# Patient Record
Sex: FEMALE | Race: WHITE | Employment: FULL TIME | URBAN - METROPOLITAN AREA
[De-identification: names, ages, dates, MRNs, and addresses within clinical notes are randomized per-mention and may not be internally consistent; named-entity substitution may affect disease eponyms.]

---

## 2017-03-03 ENCOUNTER — HOSPITAL ENCOUNTER (OUTPATIENT)
Dept: MAMMOGRAPHY | Age: 57
Discharge: HOME | End: 2017-03-03
Attending: GENERAL PRACTICE
Payer: COMMERCIAL

## 2017-03-03 DIAGNOSIS — Z12.31 VISIT FOR SCREENING MAMMOGRAM: ICD-10-CM

## 2017-03-03 PROCEDURE — 77067 SCR MAMMO BI INCL CAD: CPT

## 2018-03-28 ENCOUNTER — HOSPITAL ENCOUNTER (OUTPATIENT)
Dept: MAMMOGRAPHY | Age: 58
Discharge: HOME | End: 2018-03-28
Attending: GENERAL PRACTICE
Payer: COMMERCIAL

## 2018-03-28 DIAGNOSIS — Z12.31 SCREENING MAMMOGRAM, ENCOUNTER FOR: ICD-10-CM

## 2018-03-28 PROCEDURE — 77067 SCR MAMMO BI INCL CAD: CPT | Performed by: GENERAL PRACTICE

## 2019-06-19 ENCOUNTER — ORDER TRANSCRIPTION (OUTPATIENT)
Dept: ADMINISTRATIVE | Facility: HOSPITAL | Age: 59
End: 2019-06-19

## 2019-06-19 DIAGNOSIS — Z12.39 BREAST CANCER SCREENING: Primary | ICD-10-CM

## 2019-06-24 ENCOUNTER — HOSPITAL ENCOUNTER (OUTPATIENT)
Dept: MAMMOGRAPHY | Age: 59
Discharge: HOME | End: 2019-06-24
Attending: GENERAL PRACTICE
Payer: COMMERCIAL

## 2019-06-24 DIAGNOSIS — Z12.39 BREAST CANCER SCREENING: ICD-10-CM

## 2019-06-24 PROCEDURE — 77067 SCR MAMMO BI INCL CAD: CPT | Performed by: GENERAL PRACTICE

## 2019-07-08 PROBLEM — Z98.891 HISTORY OF CESAREAN SECTION: Status: ACTIVE | Noted: 2019-07-08

## 2019-07-08 PROCEDURE — 87624 HPV HI-RISK TYP POOLED RSLT: CPT | Performed by: NURSE PRACTITIONER

## 2019-07-08 PROCEDURE — 88175 CYTOPATH C/V AUTO FLUID REDO: CPT | Performed by: NURSE PRACTITIONER

## 2020-06-30 ENCOUNTER — ORDER TRANSCRIPTION (OUTPATIENT)
Dept: ADMINISTRATIVE | Facility: HOSPITAL | Age: 60
End: 2020-06-30

## 2020-06-30 DIAGNOSIS — Z12.31 ENCOUNTER FOR SCREENING MAMMOGRAM FOR MALIGNANT NEOPLASM OF BREAST: Primary | ICD-10-CM

## 2020-07-09 ENCOUNTER — HOSPITAL ENCOUNTER (OUTPATIENT)
Dept: MAMMOGRAPHY | Age: 60
Discharge: HOME | End: 2020-07-09
Attending: GENERAL PRACTICE
Payer: COMMERCIAL

## 2020-07-09 DIAGNOSIS — Z12.31 ENCOUNTER FOR SCREENING MAMMOGRAM FOR MALIGNANT NEOPLASM OF BREAST: ICD-10-CM

## 2020-07-09 PROCEDURE — 77067 SCR MAMMO BI INCL CAD: CPT | Performed by: GENERAL PRACTICE

## 2021-02-10 DIAGNOSIS — Z23 NEED FOR VACCINATION: ICD-10-CM

## 2021-02-15 ENCOUNTER — IMMUNIZATION (OUTPATIENT)
Dept: LAB | Age: 61
End: 2021-02-15
Attending: HOSPITALIST
Payer: COMMERCIAL

## 2021-02-15 DIAGNOSIS — Z23 NEED FOR VACCINATION: Primary | ICD-10-CM

## 2021-02-15 PROCEDURE — 0001A SARSCOV2 VAC 30MCG/0.3ML IM: CPT

## 2021-03-08 ENCOUNTER — IMMUNIZATION (OUTPATIENT)
Dept: LAB | Age: 61
End: 2021-03-08
Attending: HOSPITALIST
Payer: COMMERCIAL

## 2021-03-08 DIAGNOSIS — Z23 NEED FOR VACCINATION: Primary | ICD-10-CM

## 2021-03-08 PROCEDURE — 0002A SARSCOV2 VAC 30MCG/0.3ML IM: CPT

## 2021-07-22 ENCOUNTER — HOSPITAL ENCOUNTER (OUTPATIENT)
Dept: MAMMOGRAPHY | Age: 61
Discharge: HOME | End: 2021-07-22
Attending: OBSTETRICS & GYNECOLOGY
Payer: COMMERCIAL

## 2021-07-22 DIAGNOSIS — Z12.31 BREAST CANCER SCREENING BY MAMMOGRAM: ICD-10-CM

## 2021-07-22 PROCEDURE — 77063 BREAST TOMOSYNTHESIS BI: CPT | Performed by: GENERAL PRACTICE

## 2021-07-22 PROCEDURE — 77067 SCR MAMMO BI INCL CAD: CPT | Performed by: GENERAL PRACTICE

## 2022-07-11 ENCOUNTER — ORDER TRANSCRIPTION (OUTPATIENT)
Dept: ADMINISTRATIVE | Facility: HOSPITAL | Age: 62
End: 2022-07-11

## 2022-07-11 DIAGNOSIS — Z12.31 ENCOUNTER FOR SCREENING MAMMOGRAM FOR MALIGNANT NEOPLASM OF BREAST: Primary | ICD-10-CM

## 2022-07-25 ENCOUNTER — HOSPITAL ENCOUNTER (OUTPATIENT)
Dept: MAMMOGRAPHY | Age: 62
Discharge: HOME | End: 2022-07-25
Attending: GENERAL PRACTICE
Payer: COMMERCIAL

## 2022-07-25 DIAGNOSIS — Z12.31 ENCOUNTER FOR SCREENING MAMMOGRAM FOR MALIGNANT NEOPLASM OF BREAST: ICD-10-CM

## 2022-07-25 PROCEDURE — 77067 SCR MAMMO BI INCL CAD: CPT | Performed by: GENERAL PRACTICE

## 2022-07-25 PROCEDURE — 77063 BREAST TOMOSYNTHESIS BI: CPT | Performed by: GENERAL PRACTICE

## 2023-06-12 PROCEDURE — 87624 HPV HI-RISK TYP POOLED RSLT: CPT

## 2023-07-06 ENCOUNTER — HOSPITAL ENCOUNTER (OUTPATIENT)
Dept: BONE DENSITY | Age: 63
Discharge: HOME | End: 2023-07-06
Payer: COMMERCIAL

## 2023-07-06 DIAGNOSIS — Z78.0 MENOPAUSE: ICD-10-CM

## 2023-07-06 PROCEDURE — 77080 DXA BONE DENSITY AXIAL: CPT

## 2023-07-26 ENCOUNTER — HOSPITAL ENCOUNTER (OUTPATIENT)
Dept: MAMMOGRAPHY | Age: 63
Discharge: HOME | End: 2023-07-26
Payer: COMMERCIAL

## 2023-07-26 DIAGNOSIS — Z12.31 ENCOUNTER FOR SCREENING MAMMOGRAM FOR BREAST CANCER: ICD-10-CM

## 2023-07-26 PROCEDURE — 77063 BREAST TOMOSYNTHESIS BI: CPT

## 2023-07-26 PROCEDURE — 77067 SCR MAMMO BI INCL CAD: CPT

## 2024-07-18 ENCOUNTER — ORDER TRANSCRIPTION (OUTPATIENT)
Dept: ADMINISTRATIVE | Facility: HOSPITAL | Age: 64
End: 2024-07-18

## 2024-07-18 DIAGNOSIS — Z12.31 ENCOUNTER FOR SCREENING MAMMOGRAM FOR MALIGNANT NEOPLASM OF BREAST: Primary | ICD-10-CM

## 2024-07-29 ENCOUNTER — HOSPITAL ENCOUNTER (OUTPATIENT)
Dept: MAMMOGRAPHY | Age: 64
Discharge: HOME | End: 2024-07-29
Attending: GENERAL PRACTICE
Payer: COMMERCIAL

## 2024-07-29 DIAGNOSIS — Z12.31 ENCOUNTER FOR SCREENING MAMMOGRAM FOR MALIGNANT NEOPLASM OF BREAST: ICD-10-CM

## 2024-07-29 PROCEDURE — 77067 SCR MAMMO BI INCL CAD: CPT | Performed by: GENERAL PRACTICE

## 2024-07-29 PROCEDURE — 77063 BREAST TOMOSYNTHESIS BI: CPT | Performed by: GENERAL PRACTICE

## 2024-11-01 ENCOUNTER — APPOINTMENT (OUTPATIENT)
Dept: GENERAL RADIOLOGY | Age: 64
End: 2024-11-01
Attending: EMERGENCY MEDICINE
Payer: COMMERCIAL

## 2024-11-01 ENCOUNTER — HOSPITAL ENCOUNTER (OUTPATIENT)
Age: 64
Discharge: HOME | End: 2024-11-01
Attending: EMERGENCY MEDICINE
Payer: COMMERCIAL

## 2024-11-01 VITALS
OXYGEN SATURATION: 100 % | BODY MASS INDEX: 29.99 KG/M2 | HEART RATE: 70 BPM | HEIGHT: 65 IN | DIASTOLIC BLOOD PRESSURE: 85 MMHG | RESPIRATION RATE: 18 BRPM | TEMPERATURE: 98 F | WEIGHT: 180 LBS | SYSTOLIC BLOOD PRESSURE: 151 MMHG

## 2024-11-01 DIAGNOSIS — R00.2 PALPITATIONS: Primary | ICD-10-CM

## 2024-11-01 DIAGNOSIS — F43.0 STRESS REACTION: ICD-10-CM

## 2024-11-01 LAB
#MXD IC: 0.6 X10ˆ3/UL (ref 0.1–1)
BUN BLD-MCNC: 20 MG/DL (ref 7–18)
CHLORIDE BLD-SCNC: 102 MMOL/L (ref 98–112)
CO2 BLD-SCNC: 25 MMOL/L (ref 21–32)
CREAT BLD-MCNC: 1 MG/DL
DDIMER WHOLE BLOOD: <200 NG/ML DDU (ref ?–400)
EGFRCR SERPLBLD CKD-EPI 2021: 64 ML/MIN/1.73M2 (ref 60–?)
GLUCOSE BLD-MCNC: 96 MG/DL (ref 70–99)
HCT VFR BLD AUTO: 40.5 %
HCT VFR BLD CALC: 39 %
HGB BLD-MCNC: 13.5 G/DL
ISTAT IONIZED CALCIUM FOR CHEM 8: 1.21 MMOL/L (ref 1.12–1.32)
LYMPHOCYTES # BLD AUTO: 1.6 X10ˆ3/UL (ref 1–4)
LYMPHOCYTES NFR BLD AUTO: 27.9 %
MCH RBC QN AUTO: 29.9 PG (ref 26–34)
MCHC RBC AUTO-ENTMCNC: 33.3 G/DL (ref 31–37)
MCV RBC AUTO: 89.6 FL (ref 80–100)
MIXED CELL %: 11.4 %
NEUTROPHILS # BLD AUTO: 3.4 X10ˆ3/UL (ref 1.5–7.7)
NEUTROPHILS NFR BLD AUTO: 60.7 %
PLATELET # BLD AUTO: 254 X10ˆ3/UL (ref 150–450)
POTASSIUM BLD-SCNC: 4.1 MMOL/L (ref 3.6–5.1)
RBC # BLD AUTO: 4.52 X10ˆ6/UL
SODIUM BLD-SCNC: 139 MMOL/L (ref 136–145)
TROPONIN I BLD-MCNC: <0.02 NG/ML
WBC # BLD AUTO: 5.6 X10ˆ3/UL (ref 4–11)

## 2024-11-01 PROCEDURE — 85025 COMPLETE CBC W/AUTO DIFF WBC: CPT | Performed by: EMERGENCY MEDICINE

## 2024-11-01 PROCEDURE — 93005 ELECTROCARDIOGRAM TRACING: CPT

## 2024-11-01 PROCEDURE — 93010 ELECTROCARDIOGRAM REPORT: CPT

## 2024-11-01 PROCEDURE — 99215 OFFICE O/P EST HI 40 MIN: CPT

## 2024-11-01 PROCEDURE — 99205 OFFICE O/P NEW HI 60 MIN: CPT

## 2024-11-01 PROCEDURE — 80047 BASIC METABLC PNL IONIZED CA: CPT

## 2024-11-01 PROCEDURE — 84484 ASSAY OF TROPONIN QUANT: CPT

## 2024-11-01 PROCEDURE — 85378 FIBRIN DEGRADE SEMIQUANT: CPT | Performed by: EMERGENCY MEDICINE

## 2024-11-01 PROCEDURE — 36415 COLL VENOUS BLD VENIPUNCTURE: CPT

## 2024-11-01 PROCEDURE — 71046 X-RAY EXAM CHEST 2 VIEWS: CPT | Performed by: EMERGENCY MEDICINE

## 2024-11-01 NOTE — ED INITIAL ASSESSMENT (HPI)
Presents with chest palpitations and left arm pain that was appearing a few days ago. Numb sensation that was present in both arms and down middle back. No previous cardiac hx.   Not a current smoker.

## 2024-11-01 NOTE — ED PROVIDER NOTES
Patient Seen in: [facility]      History     Chief Complaint   Patient presents with    Palpitations     Stated Complaint: irregular heart beat/back pain    Subjective:   HPI  Feels a fluttering in her chest on and off for her \"whole life\". Never had it medically evaluated. Two days ago the fluttering worsened. Previously was intermittent and not every day. Two days ago had left sided chest pain all night long which has resolved. Since then the fluttering has been more peristtent. After the episode of  chest pain resolved she had a band of pain and  numbness across her back. The numbness radiated down both arms. The back pain and numbness are gone as well. Today the fluttering is more prominent after lunch. School nurse listened to her and told her she has irregular heart beat.    Stress level is high. Her son  in July. She is  and having difficulty in a relationship right now. She takes xanax to sleep at night. Says she is sleeping and eating well. Has a support system that is wonderful. Denies suicidal ideation.     Objective:     Past Medical History:    Bursitis    Herniated disc    Thyroid disease              Past Surgical History:   Procedure Laterality Date    Anesth, section                  Social History     Socioeconomic History    Marital status:    Tobacco Use    Smoking status: Former     Current packs/day: 0.00     Types: Cigarettes     Quit date: 2010     Years since quittin.8    Smokeless tobacco: Former   Vaping Use    Vaping status: Never Used   Substance and Sexual Activity    Alcohol use: Yes    Drug use: Never    Sexual activity: Yes     Partners: Male              Review of Systems    Positive for stated complaint: irregular heart beat/back pain  Other systems are as noted in HPI.  Constitutional and vital signs reviewed.      All other systems reviewed and negative except as noted above.    Physical Exam     ED Triage Vitals [24  1517]   /85   Pulse 70   Resp 18   Temp 97.9 °F (36.6 °C)   Temp src Temporal   SpO2 100 %   O2 Device None (Room air)       Current Vitals:   Vital Signs  BP: 151/85  Pulse: 70  Resp: 18  Temp: 97.9 °F (36.6 °C)  Temp src: Temporal    Oxygen Therapy  SpO2: 100 %  O2 Device: None (Room air)        Physical Exam  Vitals and nursing note reviewed.   Constitutional:       Appearance: Normal appearance. She is well-developed.   HENT:      Head: Normocephalic and atraumatic.   Cardiovascular:      Rate and Rhythm: Normal rate and regular rhythm.      Heart sounds: Normal heart sounds.   Pulmonary:      Effort: Pulmonary effort is normal. No respiratory distress.      Breath sounds: Normal breath sounds.   Musculoskeletal:      Comments: Left lower extremity swelling and mild tenderness around the ankle. Dilated superficial veins. Distal neurovascular intact.    Skin:     General: Skin is warm and dry.      Capillary Refill: Capillary refill takes less than 2 seconds.   Neurological:      General: No focal deficit present.      Mental Status: She is alert.      Sensory: No sensory deficit.   Psychiatric:         Mood and Affect: Mood normal.         Behavior: Behavior normal.            ED Course     Labs Reviewed   POCT ISTAT CHEM8 CARTRIDGE - Abnormal; Notable for the following components:       Result Value    ISTAT BUN 20 (*)     All other components within normal limits   D-DIMER (POC) - Normal   ISTAT TROPONIN - Normal   POCT CBC     EKG    Rate, intervals and axes as noted on EKG Report.  Rate: 70  Rhythm: Sinus Rhythm  Reading: PVC. No STEMI.                        MDM           Medical Decision Making  ACS, primary dysrhythmia, stress reaction all in dfferential. EKG reviewed by myself. Labs reviewed. CBC, CHEM, dimer and trop all normal. CXR reviewed by myself. No pneumonia. Likely atelectasis. No infectious symptoms.   Patient reassured. Advised to follow up with primary care for further eval. Also will  place behavioral health navigator order.     Disposition and Plan     Clinical Impression:  1. Palpitations    2. Stress reaction         Disposition:  Discharge  11/1/2024  5:15 pm    Follow-up:  Sonia Kinsey S [address] [id number] [phone]      As needed          Medications Prescribed:  Current Discharge Medication List              Supplementary Documentation:

## 2024-11-04 ENCOUNTER — TELEPHONE (OUTPATIENT)
Facility: CLINIC | Age: 64
End: 2024-11-04

## 2024-11-04 LAB
ATRIAL RATE: 70 BPM
P AXIS: 46 DEGREES
P-R INTERVAL: 172 MS
Q-T INTERVAL: 394 MS
QRS DURATION: 98 MS
QTC CALCULATION (BEZET): 425 MS
R AXIS: 15 DEGREES
T AXIS: 7 DEGREES
VENTRICULAR RATE: 70 BPM

## 2024-11-05 ENCOUNTER — TELEPHONE (OUTPATIENT)
Age: 64
End: 2024-11-05

## 2024-11-05 NOTE — TELEPHONE ENCOUNTER
Hello,     The [facility] Navigation team has attempted to reach you regarding an order from Dr. Turner's office. We are reaching out in order to assist you in coordinating care and resources that may meet your needs. Please give our office a call at [phone]. For more immediate assistance you can contact our 24-hour help line at [phone]. We look forward to hearing from you soon.

## 2024-11-13 ENCOUNTER — TELEPHONE (OUTPATIENT)
Age: 64
End: 2024-11-13

## 2024-11-13 NOTE — TELEPHONE ENCOUNTER
Hello,  Sorry I missed you - I am reaching out from the [facility] Navigation department, following up on an order from your provider's office to assist in connecting you with resources for care. If you would like to discuss this further, please give us a call back at [phone], or for more immediate assistance you can contact our 24-hour help line at [phone]. We look forward to hearing from you soon.

## 2024-12-03 ENCOUNTER — OFFICE VISIT (OUTPATIENT)
Facility: CLINIC | Age: 64
End: 2024-12-03
Payer: COMMERCIAL

## 2024-12-03 VITALS
WEIGHT: 177 LBS | HEIGHT: 65 IN | BODY MASS INDEX: 29.49 KG/M2 | DIASTOLIC BLOOD PRESSURE: 76 MMHG | SYSTOLIC BLOOD PRESSURE: 120 MMHG

## 2024-12-03 DIAGNOSIS — Z78.0 MENOPAUSE: ICD-10-CM

## 2024-12-03 DIAGNOSIS — Z12.11 SCREEN FOR COLON CANCER: ICD-10-CM

## 2024-12-03 DIAGNOSIS — Z01.419 ENCOUNTER FOR WELL WOMAN EXAM WITH ROUTINE GYNECOLOGICAL EXAM: Primary | ICD-10-CM

## 2024-12-03 DIAGNOSIS — Z12.6 SCREENING FOR BLADDER CANCER: ICD-10-CM

## 2024-12-03 LAB
APPEARANCE: CLEAR
BILIRUBIN: NEGATIVE
GLUCOSE (URINE DIPSTICK): NEGATIVE MG/DL
KETONES (URINE DIPSTICK): NEGATIVE MG/DL
LEUKOCYTES: NEGATIVE
NITRITE, URINE: NEGATIVE
OCCULT BLOOD: NEGATIVE
PH, URINE: 5 (ref 4.5–8)
PROTEIN (URINE DIPSTICK): NEGATIVE MG/DL
SPECIFIC GRAVITY: 1.02 (ref 1–1.03)
URINE-COLOR: YELLOW
UROBILINOGEN,SEMI-QN: 0.2 MG/DL (ref 0–1.9)

## 2024-12-03 PROCEDURE — 81003 URINALYSIS AUTO W/O SCOPE: CPT

## 2024-12-03 PROCEDURE — 99396 PREV VISIT EST AGE 40-64: CPT

## 2024-12-03 RX ORDER — ALPRAZOLAM 1 MG/1
1 TABLET ORAL NIGHTLY PRN
COMMUNITY

## 2024-12-03 RX ORDER — CALCIUM CARBONATE/VITAMIN D3 600 MG-10
250 TABLET ORAL DAILY
COMMUNITY

## 2024-12-03 NOTE — PROGRESS NOTES
GYN H&P     Genetic questionnaire reviewed with the patient and she will be referred for genetic counseling if the questionnaire had any positive results.    The [facility] intake form was also reviewed regarding contraception, menstrual periods, urinary health, and vaginal / sexual health    12/3/2024  4:58 PM    Chief Complaint   Patient presents with    Gynecologic Exam       HPI: Janet is a 64 year old  No LMP recorded. (Menstrual status: Menopause).  (contraception: postmenopausal) here for her annual gyn exam.     She has no complaints.  Menses absent with menopause, denies PMB or VMS. Denies any pelvic or breast complaints. Denies problems with vaginal dryness with intercourse. Her son recently passed away from MS-related complication in July. Coping well, denies need for additional resources.    Previous encounters and chart reviewed.     OB History    Para Term  AB Living   1 1 1     1   SAB IAB Ectopic Multiple Live Births           1      # Outcome Date GA Lbr Raoul/2nd Weight Sex Type Anes PTL Lv   1 Term 91 37w0d  7 lb 10 oz (3.459 kg) M CS-Unspec   ENEDINA       GYN hx:   Menarche: 13  Period Cycle (Days): n/a  Period Duration (Days): n/a  Use of Birth Control (if yes, specify type): Postmenopausal  Pap Date: 23  Pap Result Notes: Neg/Neg ; 2019 Neg/Neg; 2016 Normal  Follow Up Recommendation:       Past Medical History:    Bursitis    Herniated disc    Thyroid disease     Past Surgical History:   Procedure Laterality Date    Anesth, section       Allergies[1]  Medications Ordered Prior to Encounter[2]  Family History   Problem Relation Age of Onset    Heart Disease Father     Diabetes Father     Stroke Father     Arthritis Mother 58        rheumatoid    Other (lymphoma) Mother     Osteoporosis Mother     Other (muscular dystophy) Son     Heart Disease Son         & lung issues     Social History     Socioeconomic History    Marital status:       Spouse name: Not on file    Number of children: Not on file    Years of education: Not on file    Highest education level: Not on file   Occupational History    Not on file   Tobacco Use    Smoking status: Former     Current packs/day: 0.00     Types: Cigarettes     Quit date: 2010     Years since quittin.9     Passive exposure: Past    Smokeless tobacco: Former   Vaping Use    Vaping status: Never Used   Substance and Sexual Activity    Alcohol use: Yes    Drug use: Never    Sexual activity: Yes     Partners: Male   Other Topics Concern    Not on file   Social History Narrative    Not on file     Social Drivers of Health     Financial Resource Strain: Not on file   Food Insecurity: Not on file   Transportation Needs: Not on file   Physical Activity: Not on file   Stress: Not on file   Social Connections: Not on file   Housing Stability: Not on file       ROS:     Review of Systems:  General: denies fevers, chills, fatigue and malaise.   Eyes: no visual changes, denies headaches  ENT: no complaints, denies earaches, runny nose, epistaxis, throat pain or sore throat  Respiratory: denies SOB, dyspnea, cough or wheezing  Cardiovascular: denies chest pain, palpitations, exercise intolerance   GI: denies abdominal pain, diarrhea, constipation  : no complaints, denies dysuria, increased urinary frequency. , no dyspareunia   Hematological/lymphatic: denies history of excessive bleeding or bruising, denies dizziness, lightheadedness.   Breast: denies rashes, skin changes, pain, lumps or discharge   Psychiatric: denies depression, changes in sleep patterns, anxiety  Endocrine: denies hot or cold intolerance, mood changes   Neurological: denies changes in sight, smell, hearing or taste. Denies seizures or tremors  Immunological: denies allergies, denies anaphylaxis, or swollen lymph nodes  Musculoskeletal: denies joint pain, morning stiffness, decreased range of motion         O /76   Ht 65\"    Wt 177 lb (80.3 kg)   BMI 29.45 kg/m²         Wt Readings from Last 6 Encounters:   12/03/24 177 lb (80.3 kg)   11/01/24 180 lb (81.6 kg)   06/12/23 167 lb (75.8 kg)   07/08/19 176 lb (79.8 kg)   12/04/14 175 lb (79.4 kg)   03/15/14 140 lb (63.5 kg)     Exam:   GENERAL: well developed, well nourished, in no apparent distress, oriented.  SKIN: no rashes, no suspicious lesions  HEENT: normal  NECK: supple; no thyromegaly, no adenopathy  LUNGS: clear to auscultation  CARDIOVASCULAR: normal S1, S2, RRR  BREASTS: soft, nontender, no palpable masses or nodes, no nipple discharge, no skin changes, no axillary adenopathy  ABDOMEN:   soft, non distended; non tender, no masses  PELVIC: External Genitalia: Normal appearing, no lesions.    Vagina: normal pink mucosa, no lesions, normal clear discharge.    Bladder well supported.  No  anterior or posterior hernias    Cervix: multiparous, no lesions , No CMT     Uterus: AVAF, mobile, non tender, normal size    Adnexa: non tender, no masses, normal size    Rectal: deferred  EXTREMITIES:  non tender without edema        A/P: Patient is 64 year old female with no complaints. Here for well woman exam.            Patient counseled on:    Diet/exercise.      Self Breast Exams     Safe sex practices / and living environment     Vaccines:  Annual Flu    Pap: neg/neg - Year:  6/2023  GC/Chlamydia:  n/a  Mammogram:  7/2024   Dexa:  n/a  Colonoscopy / Cologuard:   cologuard ordered  Lipid / Cholesterol:  through work      Meds This Visit:    Requested Prescriptions      No prescriptions requested or ordered in this encounter       1. Encounter for well woman exam with routine gynecological exam    2. Screening for bladder cancer  - Urinalysis [27668]    3. Menopause    4. Screen for colon cancer  - COLOGUARD COLON CANCER SCREENING (EXTERNAL)    Discussed colon cancer screening, recommended colonoscopy. Patient reluctant to colonoscopy but open to cologuard, order placed    Return in about  1 year (around 12/3/2025) for Well Woman Exam.    MARY Pond   12/3/2024  4:58 PM       This note was created by [employer] voice recognition. Errors in content may be related to improper recognition by the system; efforts to review and correct have been done but errors may still exist. Please contact me with any questions.    Note to patient and family   The 21st Century Cures Act makes medical notes available to patients in the interest of transparency.  However, please be advised that this is a medical document.  It is intended as [id number] communication.  It is written in medical language which may contain abbreviations or verbiage that are technical and unfamiliar.  It may appear blunt or direct.  Medical documents are intended to carry relevant information, facts as evident, and the clinical opinion of the practitioner.           [1] No Known Allergies  [2]   Current Outpatient Medications on File Prior to Visit   Medication Sig Dispense Refill    cyanocobalamin 250 MCG Oral Tab Take 1 tablet (250 mcg total) by mouth daily. Vitamin b12      ALPRAZolam 1 MG Oral Tab Take 1 tablet (1 mg total) by mouth nightly as needed.      Ascorbic Acid (VITAMIN C) 1000 MG Oral Tab Take 1 tablet (1,000 mg total) by mouth daily.      Multiple Vitamin (ONE-DAILY MULTI VITAMINS) Oral Tab Take 1 tablet by mouth daily.      Cholecalciferol (VITAMIN D) 1000 units Oral Tab Take by mouth.      Multiple Vitamins-Minerals (BIOTIN PLUS/CALCIUM/VIT D3) Oral Tab Take by mouth.       No current facility-administered medications on file prior to visit.